# Patient Record
Sex: FEMALE | Race: WHITE
[De-identification: names, ages, dates, MRNs, and addresses within clinical notes are randomized per-mention and may not be internally consistent; named-entity substitution may affect disease eponyms.]

---

## 2018-06-18 ENCOUNTER — HOSPITAL ENCOUNTER (EMERGENCY)
Dept: HOSPITAL 17 - NEPK | Age: 38
Discharge: HOME | End: 2018-06-18
Payer: SELF-PAY

## 2018-06-18 VITALS
OXYGEN SATURATION: 98 % | TEMPERATURE: 98.3 F | DIASTOLIC BLOOD PRESSURE: 66 MMHG | HEART RATE: 116 BPM | RESPIRATION RATE: 16 BRPM | SYSTOLIC BLOOD PRESSURE: 117 MMHG

## 2018-06-18 VITALS — WEIGHT: 154.32 LBS | BODY MASS INDEX: 26.35 KG/M2 | HEIGHT: 64 IN

## 2018-06-18 DIAGNOSIS — R00.0: ICD-10-CM

## 2018-06-18 DIAGNOSIS — R05: ICD-10-CM

## 2018-06-18 DIAGNOSIS — Z72.0: ICD-10-CM

## 2018-06-18 DIAGNOSIS — J02.9: Primary | ICD-10-CM

## 2018-06-18 PROCEDURE — 99283 EMERGENCY DEPT VISIT LOW MDM: CPT

## 2018-06-18 PROCEDURE — 87081 CULTURE SCREEN ONLY: CPT

## 2018-06-18 PROCEDURE — 96372 THER/PROPH/DIAG INJ SC/IM: CPT

## 2018-06-18 PROCEDURE — 87880 STREP A ASSAY W/OPTIC: CPT

## 2018-06-18 NOTE — PD
HPI


Chief Complaint:  ENT Complaint


Time Seen by Provider:  21:49


Travel History


International Travel<30 days:  No


Contact w/Intl Traveler<30days:  No


Traveled to known affect area:  No





History of Present Illness


HPI


37-year-old female presents for evaluation of a sore throat.  Symptoms started 

2 days ago.  She describes it as a sore pain which is moderate, constant, 

aggravated by swallowing with no relieving factors.  Denies fevers, chills, 

myalgias.  She does have a slight dry cough.  No sick contacts.  No rash.  No 

recent travel.  No other complaints.





Formerly [facility], [facility]


Past Medical History


Diminished Hearing:  No


Pregnant?:  Unknown





Social History


Alcohol Use:  Yes


Tobacco Use:  Yes


Substance Use:  No





Allergies-Medications


(Allergen,Severity, Reaction):  


Coded Allergies:  


     No Known Allergies (Unverified , 6/18/18)





Review of Systems


Except as stated in HPI:  all other systems reviewed are Neg





Physical Exam


Narrative


GENERAL: Well-developed well-nourished female no acute distress


SKIN: Warm and dry.


HEAD: Atraumatic. Normocephalic. 


EYES: Pupils equal and round. No scleral icterus. No injection or drainage. 


ENT: No nasal bleeding or discharge.  Mucous membranes pink and moist.  The 

oropharynx is erythematous and edematous.  There is no exudate.  Uvula midline 

with no mass-effect.


NECK: Trachea midline. No JVD.  Anterior cervical lymphadenopathy is present.


CARDIOVASCULAR: Regular rate and rhythm.  No murmur appreciated.


RESPIRATORY: No accessory muscle use. Clear to auscultation. Breath sounds 

equal bilaterally. 


GASTROINTESTINAL: Abdomen soft, non-tender, nondistended. Hepatic and splenic 

margins not palpable. 


MUSCULOSKELETAL: No obvious deformities.


NEUROLOGICAL: Awake and alert. No obvious cranial nerve deficits.  Motor 

grossly within normal limits. Normal speech.





Data


Data


Last Documented VS





Vital Signs








  Date Time  Temp Pulse Resp B/P (MAP) Pulse Ox O2 Delivery O2 Flow Rate FiO2


 


6/18/18 19:39 98.3 116 16 117/66 (83) 98   








Orders





 Orders


Group A Rapid Strep Screen (6/18/18 21:23)


Dexamethasone Inj (Decadron Inj) (6/18/18 22:00)


Ketorolac Inj (Toradol Inj) (6/18/18 22:00)


Strep Culture (Group A) (6/18/18 21:30)


Penicillin G Benzathine Inj (Bicillin L- (6/18/18 22:15)








[facility]


Medical Decision Making


Medical Screen Exam Complete:  Yes


Emergency Medical Condition:  Yes


Medical Record Reviewed:  Yes


Differential Diagnosis


Pharyngitis, tonsillitis, peritonsillar abscess, infectious mononucleosis, 

herpangina, epiglottitis


Narrative Course


37-year-old female with sore throat for 2 3 days.  She is tachycardic.  She 

appears well.  She does have oral pharyngeal erythema and slight edema.  She is 

given Decadron and Toradol injections here.  Rapid strep screen is negative but 

I am highly suspicious of streptococcal pharyngitis.  She will be given 

intramuscular injection of penicillin G prior to discharge.





Diagnosis





 Primary Impression:  


 Pharyngitis





***Additional Instructions:  


Stay well hydrated no needed for pain and fever per dosing instructions on the 

bottle.  Return for any acutely new or worsening symptoms.


***Med/Other Pt SpecificInfo:  No Change to Meds


Disposition:  01 DISCHARGE HOME


Condition:  Stable











Julio Dimas Jun 18, 2018 22:42